# Patient Record
Sex: MALE | Race: WHITE | Employment: OTHER
[De-identification: names, ages, dates, MRNs, and addresses within clinical notes are randomized per-mention and may not be internally consistent; named-entity substitution may affect disease eponyms.]

---

## 2017-06-06 PROBLEM — E11.69 HYPERLIPIDEMIA ASSOCIATED WITH TYPE 2 DIABETES MELLITUS (HCC): Status: ACTIVE | Noted: 2017-06-06

## 2017-06-06 PROBLEM — E78.5 HYPERLIPIDEMIA ASSOCIATED WITH TYPE 2 DIABETES MELLITUS (HCC): Status: ACTIVE | Noted: 2017-06-06

## 2017-06-06 PROBLEM — E78.5 HYPERLIPIDEMIA ASSOCIATED WITH TYPE 2 DIABETES MELLITUS: Status: ACTIVE | Noted: 2017-06-06

## 2017-06-06 PROBLEM — E11.69 HYPERLIPIDEMIA ASSOCIATED WITH TYPE 2 DIABETES MELLITUS: Status: ACTIVE | Noted: 2017-06-06

## 2017-06-06 PROBLEM — E11.69 HYPERLIPIDEMIA ASSOCIATED WITH TYPE 2 DIABETES MELLITUS  (HCC): Status: ACTIVE | Noted: 2017-06-06

## 2017-06-06 PROBLEM — I77.9 CAROTID DISEASE, BILATERAL (HCC): Status: ACTIVE | Noted: 2017-06-06

## 2017-06-06 PROBLEM — E78.5 HYPERLIPIDEMIA ASSOCIATED WITH TYPE 2 DIABETES MELLITUS  (HCC): Status: ACTIVE | Noted: 2017-06-06

## 2017-06-09 PROCEDURE — 82570 ASSAY OF URINE CREATININE: CPT | Performed by: INTERNAL MEDICINE

## 2017-06-09 PROCEDURE — 82043 UR ALBUMIN QUANTITATIVE: CPT | Performed by: INTERNAL MEDICINE

## 2017-10-31 RX ORDER — GLYBURIDE 5 MG/1
10 TABLET ORAL 2 TIMES DAILY WITH MEALS
COMMUNITY
End: 2018-01-09

## 2017-11-15 NOTE — H&P
PRE-PROCEDURE UPDATE    HPI: Amari Tran is a 67year old male. 3/10/1945. Patient presents for a colonoscopy for a Personal History of Colonic Polyps    ALLERGIES:   No Known Allergies            No current outpatient prescriptions on file.   Past biopsy/polypectomy--the planned endoscopic procedure, indications, risks, benefits and post-op precautions were discussed and questions were answered in the pre-operative area.

## 2017-11-16 ENCOUNTER — SURGERY (OUTPATIENT)
Age: 72
End: 2017-11-16

## 2017-11-16 ENCOUNTER — HOSPITAL ENCOUNTER (OUTPATIENT)
Facility: HOSPITAL | Age: 72
Setting detail: HOSPITAL OUTPATIENT SURGERY
Discharge: HOME | End: 2017-11-16
Attending: INTERNAL MEDICINE | Admitting: INTERNAL MEDICINE
Payer: MEDICARE

## 2017-11-16 VITALS
SYSTOLIC BLOOD PRESSURE: 110 MMHG | RESPIRATION RATE: 18 BRPM | OXYGEN SATURATION: 96 % | HEART RATE: 70 BPM | DIASTOLIC BLOOD PRESSURE: 66 MMHG | HEIGHT: 68 IN | BODY MASS INDEX: 46.98 KG/M2 | TEMPERATURE: 99 F | WEIGHT: 310 LBS

## 2017-11-16 DIAGNOSIS — Z86.010 HISTORY OF COLONIC POLYPS: ICD-10-CM

## 2017-11-16 PROCEDURE — 0DBM8ZX EXCISION OF DESCENDING COLON, VIA NATURAL OR ARTIFICIAL OPENING ENDOSCOPIC, DIAGNOSTIC: ICD-10-PCS | Performed by: INTERNAL MEDICINE

## 2017-11-16 PROCEDURE — 82962 GLUCOSE BLOOD TEST: CPT

## 2017-11-16 PROCEDURE — 88305 TISSUE EXAM BY PATHOLOGIST: CPT | Performed by: INTERNAL MEDICINE

## 2017-11-16 PROCEDURE — 0DBN8ZX EXCISION OF SIGMOID COLON, VIA NATURAL OR ARTIFICIAL OPENING ENDOSCOPIC, DIAGNOSTIC: ICD-10-PCS | Performed by: INTERNAL MEDICINE

## 2017-11-16 PROCEDURE — 0DBL8ZX EXCISION OF TRANSVERSE COLON, VIA NATURAL OR ARTIFICIAL OPENING ENDOSCOPIC, DIAGNOSTIC: ICD-10-PCS | Performed by: INTERNAL MEDICINE

## 2017-11-16 RX ORDER — SODIUM CHLORIDE, SODIUM LACTATE, POTASSIUM CHLORIDE, CALCIUM CHLORIDE 600; 310; 30; 20 MG/100ML; MG/100ML; MG/100ML; MG/100ML
INJECTION, SOLUTION INTRAVENOUS CONTINUOUS
Status: DISCONTINUED | OUTPATIENT
Start: 2017-11-16 | End: 2017-11-16

## 2017-11-16 RX ORDER — MEPERIDINE HYDROCHLORIDE 50 MG/ML
INJECTION INTRAMUSCULAR; INTRAVENOUS; SUBCUTANEOUS
Status: DISCONTINUED | OUTPATIENT
Start: 2017-11-16 | End: 2017-11-16

## 2017-11-16 RX ORDER — MIDAZOLAM HYDROCHLORIDE 1 MG/ML
INJECTION INTRAMUSCULAR; INTRAVENOUS
Status: DISCONTINUED | OUTPATIENT
Start: 2017-11-16 | End: 2017-11-16

## 2017-11-16 NOTE — OPERATIVE REPORT
ENDOSCOPY OPERATIVE REPORT    Patient Name:  Dena Jensen Record #: [id number]  YOB: 1945  Date of Procedure: 11/16/2017    Preoperative Diagnosis: Personal History of Colonic Polyps    Postoperative Diagnosis:Colonic Polyps    P for these polyps). At the anal verge the endoscope was retroverted and other than some small internal hemorrhoids, no other abnormalities were identified. The procedure was tolerated well and upon completion and throughtout the vital signs were stable.

## 2017-12-05 PROBLEM — I15.2 HYPERTENSION ASSOCIATED WITH DIABETES: Status: ACTIVE | Noted: 2017-12-05

## 2017-12-05 PROBLEM — E11.59 HYPERTENSION ASSOCIATED WITH DIABETES: Status: ACTIVE | Noted: 2017-12-05

## 2017-12-05 PROBLEM — E11.59 HYPERTENSION ASSOCIATED WITH DIABETES  (HCC): Status: ACTIVE | Noted: 2017-12-05

## 2017-12-05 PROBLEM — I15.2 HYPERTENSION ASSOCIATED WITH DIABETES  (HCC): Status: ACTIVE | Noted: 2017-12-05

## 2017-12-05 PROBLEM — E11.59 HYPERTENSION ASSOCIATED WITH DIABETES (HCC): Status: ACTIVE | Noted: 2017-12-05

## 2017-12-05 PROBLEM — I15.2 HYPERTENSION ASSOCIATED WITH DIABETES (HCC): Status: ACTIVE | Noted: 2017-12-05

## 2017-12-05 PROBLEM — I70.0 THORACIC AORTIC ATHEROSCLEROSIS (HCC): Status: ACTIVE | Noted: 2017-12-05

## 2018-03-04 PROBLEM — I50.32 CHRONIC DIASTOLIC CHF (CONGESTIVE HEART FAILURE) (HCC): Status: ACTIVE | Noted: 2018-03-04

## 2018-03-05 PROBLEM — D56.3 THALASSEMIA MINOR: Status: ACTIVE | Noted: 2018-03-05

## 2018-08-29 PROCEDURE — 82570 ASSAY OF URINE CREATININE: CPT | Performed by: INTERNAL MEDICINE

## 2018-08-29 PROCEDURE — 82043 UR ALBUMIN QUANTITATIVE: CPT | Performed by: INTERNAL MEDICINE

## 2019-04-11 PROBLEM — Z87.891 HISTORY OF CIGARETTE SMOKING: Status: ACTIVE | Noted: 2019-04-11

## 2019-04-24 PROCEDURE — 82330 ASSAY OF CALCIUM: CPT | Performed by: INTERNAL MEDICINE

## 2019-08-28 PROCEDURE — 87086 URINE CULTURE/COLONY COUNT: CPT | Performed by: EMERGENCY MEDICINE

## 2019-10-11 PROBLEM — H35.3111 EARLY DRY STAGE NONEXUDATIVE AGE-RELATED MACULAR DEGENERATION OF RIGHT EYE: Status: ACTIVE | Noted: 2019-10-11

## 2020-10-19 PROBLEM — D35.01 BILATERAL ADRENAL ADENOMAS: Status: ACTIVE | Noted: 2020-10-19

## 2020-10-19 PROBLEM — H25.89 OTHER AGE-RELATED CATARACT: Status: ACTIVE | Noted: 2020-10-19

## 2020-10-19 PROBLEM — D35.02 BILATERAL ADRENAL ADENOMAS: Status: ACTIVE | Noted: 2020-10-19

## 2021-11-04 PROBLEM — N39.0 RECURRENT UTI: Status: ACTIVE | Noted: 2021-11-04

## 2023-11-29 PROBLEM — D64.9 ANEMIA, UNSPECIFIED: Status: ACTIVE | Noted: 2023-11-29

## 2023-11-30 ENCOUNTER — OFFICE VISIT (OUTPATIENT)
Dept: HEMATOLOGY/ONCOLOGY | Facility: HOSPITAL | Age: 78
End: 2023-11-30
Attending: INTERNAL MEDICINE
Payer: MEDICARE

## 2023-11-30 VITALS
TEMPERATURE: 98 F | HEART RATE: 62 BPM | OXYGEN SATURATION: 95 % | RESPIRATION RATE: 18 BRPM | DIASTOLIC BLOOD PRESSURE: 60 MMHG | SYSTOLIC BLOOD PRESSURE: 112 MMHG

## 2023-11-30 DIAGNOSIS — D64.9 ANEMIA, UNSPECIFIED: Primary | ICD-10-CM

## 2023-11-30 LAB
ANTIBODY SCREEN: NEGATIVE
RH BLOOD TYPE: POSITIVE

## 2023-11-30 PROCEDURE — 86900 BLOOD TYPING SEROLOGIC ABO: CPT

## 2023-11-30 PROCEDURE — 96374 THER/PROPH/DIAG INJ IV PUSH: CPT

## 2023-11-30 PROCEDURE — 86850 RBC ANTIBODY SCREEN: CPT

## 2023-11-30 PROCEDURE — 86901 BLOOD TYPING SEROLOGIC RH(D): CPT

## 2023-11-30 PROCEDURE — 86920 COMPATIBILITY TEST SPIN: CPT

## 2023-11-30 PROCEDURE — 36430 TRANSFUSION BLD/BLD COMPNT: CPT

## 2023-11-30 RX ORDER — ACETAMINOPHEN 325 MG/1
650 TABLET ORAL ONCE
Status: COMPLETED | OUTPATIENT
Start: 2023-11-30 | End: 2023-11-30

## 2023-11-30 RX ORDER — ACETAMINOPHEN 325 MG/1
650 TABLET ORAL ONCE
Status: CANCELLED | OUTPATIENT
Start: 2023-11-30

## 2023-11-30 RX ORDER — FUROSEMIDE 10 MG/ML
20 INJECTION INTRAMUSCULAR; INTRAVENOUS ONCE
Status: CANCELLED
Start: 2023-11-30

## 2023-11-30 RX ORDER — FUROSEMIDE 10 MG/ML
20 INJECTION INTRAMUSCULAR; INTRAVENOUS ONCE
Status: COMPLETED | OUTPATIENT
Start: 2023-11-30 | End: 2023-11-30

## 2023-11-30 RX ADMIN — ACETAMINOPHEN 650 MG: 325 TABLET ORAL at 11:28:00

## 2023-11-30 RX ADMIN — FUROSEMIDE 20 MG: 10 INJECTION INTRAMUSCULAR; INTRAVENOUS at 14:11:00

## 2023-11-30 NOTE — PROGRESS NOTES
Pt tolerated 2 u PRBC per Dr. Lakhwinder Mckay order; no issues or complaints. Lasix given in between units as ordered.      Education Record    Learner:  Patient, spouse    Disease / Diagnosis: anemia     Barriers / Limitations:  None   Comments:    Method:  Discussion   Comments:    General Topics:  Plan of care reviewed   Comments:    Outcome:  Shows understanding   Comments:

## 2023-11-30 NOTE — PATIENT INSTRUCTIONS
Post Transfusion Instructions for Out-Patients    Most recipients of blood transfusions do not experience any adverse effects. You may resume your normal activities 4 to 6 hours after your blood transfusion. Occasionally, reactions of blood transfusions may be delayed (for several weeks). Symptoms of a transfusion reaction can include:    Faintness  Weakness  Nausea  Vomiting  Shortness of breath  Chest pain  Back pain  Hives  Rash  Itch  Flushing  Fever  Chills  Jaundice (yellowish tint to eyes/skin)  Dark or red urine    Though these symptoms may not be related to the blood transfusions, they should be reported to your physician. Contact both your physician and the laboratory Blood Bank (see information below) so that your symptoms can be thoroughly evaluated. Your physician's name: Dr. Ian Gomes physician's phone number: [phone]    If your physician is unavailable, contact the Emergency Department.     [facility] Blood Bank:  [address] Emergency Department:  [phone]    [facility]: [facility] Emergency Department: [phone]

## 2023-12-01 LAB
BLOOD TYPE BARCODE: 7300
UNIT VOLUME: 350 ML

## 2024-02-02 DIAGNOSIS — I50.32 CHRONIC DIASTOLIC HF (HEART FAILURE) (HCC): ICD-10-CM

## 2024-02-02 DIAGNOSIS — D50.0 ANEMIA DUE TO BLOOD LOSS: Primary | ICD-10-CM

## 2024-02-05 ENCOUNTER — OFFICE VISIT (OUTPATIENT)
Dept: HEMATOLOGY/ONCOLOGY | Facility: HOSPITAL | Age: 79
End: 2024-02-05
Attending: INTERNAL MEDICINE
Payer: MEDICARE

## 2024-02-05 VITALS
WEIGHT: 297.19 LBS | OXYGEN SATURATION: 98 % | BODY MASS INDEX: 45.04 KG/M2 | SYSTOLIC BLOOD PRESSURE: 105 MMHG | TEMPERATURE: 97 F | HEART RATE: 68 BPM | HEIGHT: 67.99 IN | DIASTOLIC BLOOD PRESSURE: 59 MMHG | RESPIRATION RATE: 16 BRPM

## 2024-02-05 DIAGNOSIS — I50.32 CHRONIC DIASTOLIC HF (HEART FAILURE) (HCC): Primary | ICD-10-CM

## 2024-02-05 DIAGNOSIS — D50.0 ANEMIA DUE TO BLOOD LOSS: ICD-10-CM

## 2024-02-05 LAB
ANTIBODY SCREEN: NEGATIVE
RH BLOOD TYPE: POSITIVE

## 2024-02-05 PROCEDURE — 36415 COLL VENOUS BLD VENIPUNCTURE: CPT

## 2024-02-05 PROCEDURE — 86901 BLOOD TYPING SEROLOGIC RH(D): CPT

## 2024-02-05 PROCEDURE — 36430 TRANSFUSION BLD/BLD COMPNT: CPT

## 2024-02-05 PROCEDURE — 86850 RBC ANTIBODY SCREEN: CPT

## 2024-02-05 PROCEDURE — 86900 BLOOD TYPING SEROLOGIC ABO: CPT

## 2024-02-05 NOTE — PATIENT INSTRUCTIONS
Post Transfusion Instructions for Out-Patients    Most recipients of blood transfusions do not experience any adverse effects.  You may resume your normal activities 4 to 6 hours after your blood transfusion.  Occasionally, reactions of blood transfusions may be delayed (for several weeks).    Symptoms of a transfusion reaction can include:    Faintness  Weakness  Nausea  Vomiting  Shortness of breath  Chest pain  Back pain  Hives  Rash  Itch  Flushing  Fever  Chills  Jaundice (yellowish tint to eyes/skin)  Dark or red urine    Though these symptoms may not be related to the blood transfusions, they should be reported to your physician.  Contact both your physician and the laboratory Blood Bank (see information below) so that your symptoms can be thoroughly evaluated.    Your physician's name: Dr. Napier  Your physician's phone number: [phone]    If your physician is unavailable, contact the Emergency Department.    [facility] Blood Bank:  [phone]  [facility] Emergency Department:  [phone]    [facility] Blood Bank: [phone]  [facility] Emergency Department: [phone]

## 2024-02-06 LAB
BLOOD TYPE BARCODE: 7300
UNIT VOLUME: 350 ML

## 2024-02-16 ENCOUNTER — OFFICE VISIT (OUTPATIENT)
Dept: HEMATOLOGY/ONCOLOGY | Facility: HOSPITAL | Age: 79
End: 2024-02-16
Attending: INTERNAL MEDICINE
Payer: MEDICARE

## 2024-02-16 VITALS
HEART RATE: 64 BPM | DIASTOLIC BLOOD PRESSURE: 66 MMHG | OXYGEN SATURATION: 100 % | TEMPERATURE: 97 F | SYSTOLIC BLOOD PRESSURE: 118 MMHG | RESPIRATION RATE: 18 BRPM

## 2024-02-16 DIAGNOSIS — D50.0 IRON DEFICIENCY ANEMIA DUE TO CHRONIC BLOOD LOSS: ICD-10-CM

## 2024-02-16 DIAGNOSIS — D50.0 ANEMIA DUE TO BLOOD LOSS: Primary | ICD-10-CM

## 2024-02-16 LAB
ANTIBODY SCREEN: NEGATIVE
RH BLOOD TYPE: POSITIVE

## 2024-02-16 PROCEDURE — 86901 BLOOD TYPING SEROLOGIC RH(D): CPT

## 2024-02-16 PROCEDURE — 86900 BLOOD TYPING SEROLOGIC ABO: CPT

## 2024-02-16 PROCEDURE — 36430 TRANSFUSION BLD/BLD COMPNT: CPT

## 2024-02-16 PROCEDURE — 96374 THER/PROPH/DIAG INJ IV PUSH: CPT

## 2024-02-16 PROCEDURE — 86850 RBC ANTIBODY SCREEN: CPT

## 2024-02-16 RX ORDER — FUROSEMIDE 10 MG/ML
20 INJECTION INTRAMUSCULAR; INTRAVENOUS ONCE
Status: COMPLETED | OUTPATIENT
Start: 2024-02-16 | End: 2024-02-16

## 2024-02-16 RX ADMIN — FUROSEMIDE 20 MG: 10 INJECTION INTRAMUSCULAR; INTRAVENOUS at 13:12:00

## 2024-02-16 NOTE — PROGRESS NOTES
Education Record    Learner:  Patient and Family Member    Disease / Diagnosis: anemia due to blood loss - hgb 6.2    Barriers / Limitations:  None   Comments:    Method:  Brief focused and Discussion   Comments:    General Topics:  Medication, Precautions, Side effects and symptom management, and Plan of care reviewed   Comments:    Outcome:  Shows understanding   Comments:    After about 50mL/s went in, patient c/o chest tightness (mild) and SOB. Patient has h/o CHF, has oral lasix at home but did not take any today. Stopped blood, vitals taken and are stable. O2 placed at 2L for patient comfort. Vernell HOLGUIN at Dr. Lizarraga's office called and made aware of current vitals and patient's symptoms. Gave order for 20mg of Lasix. Blood rate slowed from 150 to 120.     BP rechecked after lasix, stable, patient states he feels much better. Will continue to monitor. Was able to finish blood at 120 mL/hr throughout.

## 2024-02-17 LAB
BLOOD TYPE BARCODE: 7300
UNIT VOLUME: 350 ML

## 2024-04-04 ENCOUNTER — TELEPHONE (OUTPATIENT)
Dept: HEMATOLOGY/ONCOLOGY | Facility: HOSPITAL | Age: 79
End: 2024-04-04

## 2024-04-05 ENCOUNTER — OFFICE VISIT (OUTPATIENT)
Dept: HEMATOLOGY/ONCOLOGY | Facility: HOSPITAL | Age: 79
End: 2024-04-05
Attending: INTERNAL MEDICINE
Payer: MEDICARE

## 2024-04-05 VITALS
DIASTOLIC BLOOD PRESSURE: 65 MMHG | TEMPERATURE: 97 F | SYSTOLIC BLOOD PRESSURE: 133 MMHG | RESPIRATION RATE: 18 BRPM | HEIGHT: 67.99 IN | HEART RATE: 75 BPM | WEIGHT: 303 LBS | BODY MASS INDEX: 45.92 KG/M2 | OXYGEN SATURATION: 98 %

## 2024-04-05 DIAGNOSIS — D50.0 IRON DEFICIENCY ANEMIA DUE TO CHRONIC BLOOD LOSS: ICD-10-CM

## 2024-04-05 DIAGNOSIS — D50.0 ANEMIA DUE TO BLOOD LOSS: Primary | ICD-10-CM

## 2024-04-05 LAB
ANTIBODY SCREEN: NEGATIVE
RH BLOOD TYPE: POSITIVE

## 2024-04-05 PROCEDURE — 36430 TRANSFUSION BLD/BLD COMPNT: CPT

## 2024-04-05 PROCEDURE — 86920 COMPATIBILITY TEST SPIN: CPT

## 2024-04-05 PROCEDURE — 86900 BLOOD TYPING SEROLOGIC ABO: CPT

## 2024-04-05 PROCEDURE — 86901 BLOOD TYPING SEROLOGIC RH(D): CPT

## 2024-04-05 PROCEDURE — 96374 THER/PROPH/DIAG INJ IV PUSH: CPT

## 2024-04-05 PROCEDURE — 86850 RBC ANTIBODY SCREEN: CPT

## 2024-04-05 RX ORDER — FUROSEMIDE 10 MG/ML
20 INJECTION INTRAMUSCULAR; INTRAVENOUS ONCE
Status: COMPLETED | OUTPATIENT
Start: 2024-04-05 | End: 2024-04-05

## 2024-04-05 RX ORDER — FUROSEMIDE 10 MG/ML
20 INJECTION INTRAMUSCULAR; INTRAVENOUS ONCE
Status: CANCELLED
Start: 2024-04-05

## 2024-04-05 RX ADMIN — FUROSEMIDE 20 MG: 10 INJECTION INTRAMUSCULAR; INTRAVENOUS at 13:18:00

## 2024-04-05 NOTE — PROGRESS NOTES
Education Record    Learner:  Patient, Spouse, and Family Member    Disease / Diagnosis:one unit of rbc for hgb of 7    Barriers / Limitations:  None   Comments:    Method:  Discussion   Comments:    General Topics:  Procedure and Plan of care reviewed   Comments:    Outcome:  Shows understanding   Comments:Tolerated transfusion without incident. Lasix iv given. Discharged to home in stable condition.

## 2024-04-07 LAB
BLOOD TYPE BARCODE: 1700
UNIT VOLUME: 350 ML

## (undated) DEVICE — CLIP RESOLUTION 235CM

## (undated) DEVICE — FILTERLINE NASAL ADULT O2/CO2

## (undated) DEVICE — REM POLYHESIVE ADULT PATIENT RETURN ELECTRODE: Brand: VALLEYLAB

## (undated) DEVICE — ENDOSCOPY PACK - LOWER: Brand: MEDLINE INDUSTRIES, INC.

## (undated) DEVICE — 1200CC GUARDIAN II: Brand: GUARDIAN

## (undated) DEVICE — TRAP 4 CPTR CHMBR N EZ INLN

## (undated) DEVICE — 3M™ RED DOT™ MONITORING ELECTRODE WITH FOAM TAPE AND STICKY GEL, 50/BAG, 20/CASE, 72/PLT 2570: Brand: RED DOT™

## (undated) DEVICE — Device: Brand: DEFENDO AIR/WATER/SUCTION AND BIOPSY VALVE

## (undated) DEVICE — SNARE CAPTIFLEX MICRO-OVL OLY

## (undated) NOTE — LETTER
11/22/2017          Parris Marie  [address] [facility] [facility] [address]    Dear Karyle Stanley,     Here are the biopsy/pathology results from your recent Colonoscopy: The colon polyps removed were benign, but precancerous.   A repeat colonoscopy exam in 5